# Patient Record
Sex: FEMALE | Race: BLACK OR AFRICAN AMERICAN | NOT HISPANIC OR LATINO | Employment: STUDENT | URBAN - METROPOLITAN AREA
[De-identification: names, ages, dates, MRNs, and addresses within clinical notes are randomized per-mention and may not be internally consistent; named-entity substitution may affect disease eponyms.]

---

## 2018-03-05 ENCOUNTER — LAB VISIT (OUTPATIENT)
Dept: LAB | Facility: HOSPITAL | Age: 28
End: 2018-03-05
Attending: FAMILY MEDICINE
Payer: COMMERCIAL

## 2018-03-05 ENCOUNTER — OFFICE VISIT (OUTPATIENT)
Dept: INTERNAL MEDICINE | Facility: CLINIC | Age: 28
End: 2018-03-05
Payer: COMMERCIAL

## 2018-03-05 VITALS
TEMPERATURE: 99 F | BODY MASS INDEX: 20.41 KG/M2 | OXYGEN SATURATION: 96 % | HEART RATE: 83 BPM | HEIGHT: 66 IN | DIASTOLIC BLOOD PRESSURE: 78 MMHG | SYSTOLIC BLOOD PRESSURE: 112 MMHG | WEIGHT: 127 LBS

## 2018-03-05 DIAGNOSIS — N89.8 VAGINAL DISCHARGE: ICD-10-CM

## 2018-03-05 DIAGNOSIS — Z00.00 ANNUAL PHYSICAL EXAM: ICD-10-CM

## 2018-03-05 DIAGNOSIS — Z00.00 ANNUAL PHYSICAL EXAM: Primary | ICD-10-CM

## 2018-03-05 LAB
BACTERIA GENITAL QL WET PREP: ABNORMAL
CHOLEST SERPL-MCNC: 170 MG/DL
CHOLEST/HDLC SERPL: 1.8 {RATIO}
CLUE CELLS VAG QL WET PREP: ABNORMAL
FILAMENT FUNGI VAG WET PREP-#/AREA: ABNORMAL
HDLC SERPL-MCNC: 97 MG/DL
HDLC SERPL: 57.1 %
LDLC SERPL CALC-MCNC: 59.8 MG/DL
NONHDLC SERPL-MCNC: 73 MG/DL
SPECIMEN SOURCE: ABNORMAL
T VAGINALIS GENITAL QL WET PREP: ABNORMAL
TRIGL SERPL-MCNC: 66 MG/DL
WBC #/AREA VAG WET PREP: ABNORMAL
YEAST GENITAL QL WET PREP: ABNORMAL

## 2018-03-05 PROCEDURE — 87491 CHLMYD TRACH DNA AMP PROBE: CPT

## 2018-03-05 PROCEDURE — 99385 PREV VISIT NEW AGE 18-39: CPT | Mod: S$GLB,,, | Performed by: FAMILY MEDICINE

## 2018-03-05 PROCEDURE — 99999 PR PBB SHADOW E&M-EST. PATIENT-LVL III: CPT | Mod: PBBFAC,,, | Performed by: FAMILY MEDICINE

## 2018-03-05 PROCEDURE — 36415 COLL VENOUS BLD VENIPUNCTURE: CPT

## 2018-03-05 PROCEDURE — 80061 LIPID PANEL: CPT

## 2018-03-05 PROCEDURE — 87210 SMEAR WET MOUNT SALINE/INK: CPT

## 2018-03-05 RX ORDER — METRONIDAZOLE 500 MG/1
500 TABLET ORAL EVERY 12 HOURS
Qty: 14 TABLET | Refills: 0 | Status: SHIPPED | OUTPATIENT
Start: 2018-03-05 | End: 2018-03-12

## 2018-03-05 RX ORDER — FLUCONAZOLE 150 MG/1
150 TABLET ORAL DAILY
Qty: 1 TABLET | Refills: 0 | Status: SHIPPED | OUTPATIENT
Start: 2018-03-05 | End: 2018-03-06

## 2018-03-05 NOTE — PATIENT INSTRUCTIONS
Adult Immunization Schedule  Vaccine How Often Disease Prevented Who Needs It   Influenza Every year Flu. This can be especially dangerous to elderly adults or people with immune disorders. All adults   Tetanus, diphtheria (Td); or Tetanus, diphtheria, and pertussis (Tdap)* One dose of Tdap, then one dose of Td as a booster every 10 years Tetanus (lockjaw), a disease that causes muscles to spasm  Diphtheria, an infection that causes fever, weakness, and breathing problems  Pertussis, also known as whooping cough. This is a highly contagious disease that can cause serious illness. All adults  *This vaccine should be given during each pregnancy no matter how many years since the last vaccine. The vaccine increases protection for your . A  is too young to get the vaccine, but at the highest risk for severe illness and death from pertussis.   Varicella (George)** One series of 2 injections Chickenpox. This is a disease that causes itchy skin bumps, fever, and tiredness. It can lead to scarring, pneumonia, or brain inflammation. Adults who dont have evidence of immunity  **This vaccine should not be given to pregnant women. Women should avoid pregnancy for 4 weeks after the vaccine.   Human papillomavirus (HPV) One series of 3 injections Cervical cancer, caused by certain types of HPV  Vaginal and vulvar cancer  Penile cancer  Head and neck cancers  Anal cancer  Genital warts Females and males ages 15 to 26  Ask your healthcare provider if this vaccine is right for you.   Zoster--- 1 dose Herpes zoster (shingles), a painful rash marked by blisters Adults ages 60 and older.  ---You should not get this vaccine if your immune system is low. For example, if you have HIV, are taking medicines that suppress your immune system, or are getting cancer treatment.   Measles, mumps, rubella (MMR)** 1 or 2 doses, for ages 19 through 49; 1 dose for ages 50 and older if at risk Measles, a disease marked by red spots,  fever, and coughing  Mumps, a disease that causes swelling in the salivary glands. It may affect the ovaries or testes.  Rubella ([place] measles). This is a form of measles that can cause birth defects if a pregnant woman catches it. Adults born in 1957 or later who are not known to be immune to all 3 of these diseases. Ask your healthcare provider if you need a second dose.  **This vaccine should not be given to pregnant women. Women should avoid pregnancy for 4 weeks after vaccination.   Pneumococcal (PCV 13) 1 dose Pneumonia. This is an infection that causes inflammation in your lungs. It can lead to death. Adults ages 65 and older. Also, adults ages 19 and older with weak immune systems or any of these health conditions: chronic renal failure, nephrotic syndrome, functional or anatomic asplenia, cerebrospinal fluid (CSF) leaks, or cochlear implants. This vaccine adds extra protection to PCV 23 and should be given about 2 months before PCV 23.   Pneumococcal (PPSV23)  1 or 2 doses Pneumonia. This is an infection that causes inflammation in your lungs. It can lead to death. Adults ages 65 and older. Also, adults with chronic illnesses, such as asthma, COPD, heart disease, diabetes, liver disease, alcoholism, sickle cell disease, or history of splenectomy. In addition, adults with an immune disorder and adults who smoke cigarettes. This vaccine is recommended for all adults regardless of immune status.   Meningococcal  (MCV or MPSV) 1 or more doses Meningococcal disease (bacterial meningitis). This is inflammation of the membrane covering the brain and spinal cord. It can lead to death. Adults with immune deficiencies or high risk of exposure. Also, college freshmen living in dormitories and  recruits.   Hepatitis A (HepA) One series of 2 injections Hepatitis A. This is an infection that can result in acute liver inflammation and yellow skin and whites of the eyes (jaundice). Adults with risk factors, such  as clotting disorders or chronic liver disease, and adults with high risk of exposure. This includes men who have sex with men, IV drug users, and travelers to countries where hepatitis A is common.   Hepatitis B (HepB) One series of 3 injections Hepatitis B. This is an infection that causes chronic, severe liver disease. Adults with high risk of exposure, such as healthcare providers and sanitation workers, and adults with diabetes   Travelers diseases As needed Infections such as cholera, typhoid, yellow fever, polio, rabies, meningococcal disease, hepatitis A, hepatitis B Adults traveling out of the country. Required vaccines will vary, depending on the country you visit. Check the CDC website: www.cdc.gov.    ,  Based on the CDC National Immunization Program recommendations for adults.  Date Last Reviewed: 2017  © 1717-2960 [employer]. [address] [place] [address]. All rights reserved. This information is not intended as a substitute for professional medical care. Always follow your healthcare professional's instructions.        Adult Immunization Schedule  Vaccine How Often Disease Prevented Who Needs It   Influenza Every year Flu. This can be especially dangerous to elderly adults or people with immune disorders. All adults   Tetanus, diphtheria (Td); or Tetanus, diphtheria, and pertussis (Tdap)* One dose of Tdap, then one dose of Td as a booster every 10 years Tetanus (lockjaw), a disease that causes muscles to spasm  Diphtheria, an infection that causes fever, weakness, and breathing problems  Pertussis, also known as whooping cough. This is a highly contagious disease that can cause serious illness. All adults  *This vaccine should be given during each pregnancy no matter how many years since the last vaccine. The vaccine increases protection for your . A  is too young to get the vaccine, but at the highest risk for severe illness and death from pertussis.    Varicella (George)** One series of 2 injections Chickenpox. This is a disease that causes itchy skin bumps, fever, and tiredness. It can lead to scarring, pneumonia, or brain inflammation. Adults who dont have evidence of immunity  **This vaccine should not be given to pregnant women. Women should avoid pregnancy for 4 weeks after the vaccine.   Human papillomavirus (HPV) One series of 3 injections Cervical cancer, caused by certain types of HPV  Vaginal and vulvar cancer  Penile cancer  Head and neck cancers  Anal cancer  Genital warts Females and males ages 15 to 26  Ask your healthcare provider if this vaccine is right for you.   Zoster--- 1 dose Herpes zoster (shingles), a painful rash marked by blisters Adults ages 60 and older.  ---You should not get this vaccine if your immune system is low. For example, if you have HIV, are taking medicines that suppress your immune system, or are getting cancer treatment.   Measles, mumps, rubella (MMR)** 1 or 2 doses, for ages 19 through 49; 1 dose for ages 50 and older if at risk Measles, a disease marked by red spots, fever, and coughing  Mumps, a disease that causes swelling in the salivary glands. It may affect the ovaries or testes.  Rubella ([place] measles). This is a form of measles that can cause birth defects if a pregnant woman catches it. Adults born in 1957 or later who are not known to be immune to all 3 of these diseases. Ask your healthcare provider if you need a second dose.  **This vaccine should not be given to pregnant women. Women should avoid pregnancy for 4 weeks after vaccination.   Pneumococcal (PCV 13) 1 dose Pneumonia. This is an infection that causes inflammation in your lungs. It can lead to death. Adults ages 65 and older. Also, adults ages 19 and older with weak immune systems or any of these health conditions: chronic renal failure, nephrotic syndrome, functional or anatomic asplenia, cerebrospinal fluid (CSF) leaks, or cochlear implants.  This vaccine adds extra protection to PCV 23 and should be given about 2 months before PCV 23.   Pneumococcal (PPSV23)  1 or 2 doses Pneumonia. This is an infection that causes inflammation in your lungs. It can lead to death. Adults ages 65 and older. Also, adults with chronic illnesses, such as asthma, COPD, heart disease, diabetes, liver disease, alcoholism, sickle cell disease, or history of splenectomy. In addition, adults with an immune disorder and adults who smoke cigarettes. This vaccine is recommended for all adults regardless of immune status.   Meningococcal  (MCV or MPSV) 1 or more doses Meningococcal disease (bacterial meningitis). This is inflammation of the membrane covering the brain and spinal cord. It can lead to death. Adults with immune deficiencies or high risk of exposure. Also, college freshmen living in dormitories and  recruits.   Hepatitis A (HepA) One series of 2 injections Hepatitis A. This is an infection that can result in acute liver inflammation and yellow skin and whites of the eyes (jaundice). Adults with risk factors, such as clotting disorders or chronic liver disease, and adults with high risk of exposure. This includes men who have sex with men, IV drug users, and travelers to countries where hepatitis A is common.   Hepatitis B (HepB) One series of 3 injections Hepatitis B. This is an infection that causes chronic, severe liver disease. Adults with high risk of exposure, such as healthcare providers and sanitation workers, and adults with diabetes   Travelers diseases As needed Infections such as cholera, typhoid, yellow fever, polio, rabies, meningococcal disease, hepatitis A, hepatitis B Adults traveling out of the country. Required vaccines will vary, depending on the country you visit. Check the CDC website: www.cdc.gov.    ,  Based on the CDC National Immunization Program recommendations for adults.  Date Last Reviewed: 2/1/2017  © 3092-9982 [employer]. [address] [place] [address]. All rights reserved. This information is not intended as a substitute for professional medical care. Always follow your healthcare professional's instructions.

## 2018-03-05 NOTE — PROGRESS NOTES
Cyndy Rivera  03/05/2018  [id number]    Primary Doctor No  Patient Care Team:  Primary Doctor No as PCP - General  Has the patient seen any provider outside of the [facility] network since the last visit? (no). If yes, HIPPA forms completed and records requested.        Visit Type:New patient, vaginal discharge    Chief Complaint:  Chief Complaint   Patient presents with    Establish Care     vaginal discharge       History of Present Illness:  HPI     Establish Care    Additional comments: vaginal discharge       Last edited by Melina Fulton LPN on 3/5/2018  1:03 PM. (History)      Patient is here for vaginal odor.  Pap is up to date with [facility] include [facility] beds [facility] the [facility].  She reports period last month.    She denies any medical issues.    She is not on any medication.      History:  History reviewed. No pertinent past medical history.  Past Surgical History:   Procedure Laterality Date    TONSILLECTOMY       History reviewed. No pertinent family history.  Social History     Social History    Marital status: Single     Spouse name: N/A    Number of children: N/A    Years of education: N/A     Occupational History    adm/ [employer]      Social History Main Topics    Smoking status: Never Smoker    Smokeless tobacco: Never Used    Alcohol use Yes      Comment: Occasionally    Drug use: No    Sexual activity: Yes     Partners: Male     Other Topics Concern    Not on file     Social History Narrative    No narrative on file     There is no problem list on file for this patient.    Review of patient's allergies indicates:  No Known Allergies    The following were reviewed at this visit: active problem list, medication list, allergies, family history, social history, and health maintenance.    Medications:  No current outpatient prescriptions on file prior to visit.     No current facility-administered medications on file prior to visit.        Medications have been reviewed and reconciled with patient at this  visit.  Barriers to medications present (no)    Adverse reactions to current medications (no)    Over the counter medications reviewed (Yes ), and if needed added to active Medication list at this visit.     Exam:  Wt Readings from Last 3 Encounters:   03/05/18 57.6 kg (127 lb)   04/24/13 61.2 kg (135 lb)     Temp Readings from Last 3 Encounters:   03/05/18 99 °F (37.2 °C) (Tympanic)   04/24/13 98.5 °F (36.9 °C) (Oral)     BP Readings from Last 3 Encounters:   03/05/18 112/78   04/24/13 109/66     Pulse Readings from Last 3 Encounters:   03/05/18 83   04/24/13 104     Body mass index is 20.81 kg/m².    Review of Systems   Constitutional: Negative.  Negative for chills and fever.   HENT: Negative.  Negative for congestion, sinus pain and sore throat.    Eyes: Negative for blurred vision and double vision.   Respiratory: Negative for cough, sputum production, shortness of breath and wheezing.    Cardiovascular: Negative for chest pain, palpitations and leg swelling.   Gastrointestinal: Negative for abdominal pain, constipation, diarrhea, heartburn, nausea and vomiting.   Genitourinary: Negative.         Vaginal discharge     Musculoskeletal: Negative.    Skin: Negative.  Negative for rash.   Neurological: Negative.    Endo/Heme/Allergies: Negative.  Negative for polydipsia. Does not bruise/bleed easily.   Psychiatric/Behavioral: Negative for depression and substance abuse.         Physical Exam   Constitutional: She is oriented to person, place, and time. She appears well-developed and well-nourished. No distress.   HENT:   Head: Normocephalic and atraumatic.   Right Ear: External ear normal.   Left Ear: External ear normal.   Nose: Nose normal.   Mouth/Throat: Oropharynx is clear and moist. No oropharyngeal exudate.   Eyes: Conjunctivae and EOM are normal. Pupils are equal, round, and reactive to light. Right eye exhibits no discharge. Left eye exhibits no discharge.   Neck: Normal range of motion. Neck supple. No  thyromegaly present.   Cardiovascular: Normal rate, regular rhythm, normal heart sounds and intact distal pulses.    No murmur heard.  Pulmonary/Chest: Effort normal and breath sounds normal. No respiratory distress. She has no wheezes.   Abdominal: Soft. Bowel sounds are normal. She exhibits no distension and no mass. There is no tenderness.   Musculoskeletal: Normal range of motion. She exhibits no edema.   Lymphadenopathy:     She has no cervical adenopathy.   Neurological: She is alert and oriented to person, place, and time. No cranial nerve deficit.   Skin: Capillary refill takes less than 2 seconds. She is not diaphoretic.   Psychiatric: She has a normal mood and affect. Her behavior is normal. Judgment and thought content normal.   Nursing note and vitals reviewed.      Laboratory Reviewed ({N/A)  Lab Results   Component Value Date    WBC 4.97 04/24/2013    HGB 11.3 (L) 04/24/2013    HCT 37.6 04/24/2013     04/24/2013    ALT 13 04/24/2013    AST 16 04/24/2013     04/24/2013    K 4.3 04/24/2013     04/24/2013    CREATININE 0.7 04/24/2013    BUN 7 04/24/2013    CO2 22 (L) 04/24/2013       Assessment:  The primary encounter diagnosis was Annual physical exam. A diagnosis of Vaginal discharge was also pertinent to this visit.     Plan       Annual physical exam  -     Lipid panel; Future; Expected date: 03/05/2018  -     Cancel: C. trachomatis/N. gonorrhoeae by AMP DNA Vagina  -     C. trachomatis/N. gonorrhoeae by AMP DNA Urine    Vaginal discharge  -     Vaginal Screen Vagina; Future; Expected date: 03/05/2018  -     metroNIDAZOLE (FLAGYL) 500 MG tablet; Take 1 tablet (500 mg total) by mouth every 12 (twelve) hours.  Dispense: 14 tablet; Refill: 0  -     fluconazole (DIFLUCAN) 150 MG Tab; Take 1 tablet (150 mg total) by mouth once daily.  Dispense: 1 tablet; Refill: 0      Care Plan/Goals: Reviewed  (N/A)  Goals     None          Follow up: Follow-up in about 1 year (around  3/5/2019).    After visit summary was printed and given to patient upon discharge today.  Patient goals and care plan are included in After Visit Summary.

## 2018-03-06 ENCOUNTER — TELEPHONE (OUTPATIENT)
Dept: INTERNAL MEDICINE | Facility: CLINIC | Age: 28
End: 2018-03-06

## 2018-03-06 LAB
C TRACH DNA SPEC QL NAA+PROBE: NOT DETECTED
N GONORRHOEA DNA SPEC QL NAA+PROBE: NOT DETECTED

## 2018-03-06 NOTE — PROGRESS NOTES
Called pt.  Left message  Labs okay.  Wet prep showed rate bacteria.  Complete flagyl  Lipid okay.

## 2018-04-18 ENCOUNTER — PATIENT OUTREACH (OUTPATIENT)
Dept: ADMINISTRATIVE | Facility: HOSPITAL | Age: 28
End: 2018-04-18

## 2018-07-10 ENCOUNTER — PATIENT MESSAGE (OUTPATIENT)
Dept: INTERNAL MEDICINE | Facility: CLINIC | Age: 28
End: 2018-07-10

## 2018-07-12 ENCOUNTER — PATIENT MESSAGE (OUTPATIENT)
Dept: INTERNAL MEDICINE | Facility: CLINIC | Age: 28
End: 2018-07-12

## 2018-07-12 RX ORDER — METRONIDAZOLE 500 MG/1
500 TABLET ORAL EVERY 12 HOURS
Qty: 14 TABLET | Refills: 0 | Status: CANCELLED | OUTPATIENT
Start: 2018-07-12 | End: 2018-07-19

## 2018-07-12 RX ORDER — METRONIDAZOLE 500 MG/1
500 TABLET ORAL EVERY 12 HOURS
Qty: 14 TABLET | Refills: 0 | Status: SHIPPED | OUTPATIENT
Start: 2018-07-12 | End: 2018-07-19

## 2018-08-01 ENCOUNTER — PATIENT MESSAGE (OUTPATIENT)
Dept: INTERNAL MEDICINE | Facility: CLINIC | Age: 28
End: 2018-08-01

## 2018-08-13 ENCOUNTER — PATIENT MESSAGE (OUTPATIENT)
Dept: INTERNAL MEDICINE | Facility: CLINIC | Age: 28
End: 2018-08-13

## 2018-08-15 ENCOUNTER — TELEPHONE (OUTPATIENT)
Dept: OBSTETRICS AND GYNECOLOGY | Facility: CLINIC | Age: 28
End: 2018-08-15

## 2018-08-15 NOTE — TELEPHONE ENCOUNTER
Spoke to patient. Patient was under the impression that the IUD will be placed at the time of her visit. Advised patient that she has to be counseled/evaluated prior to ordering the device and that the device usually takes 2-3 weeks to receive. Advised her that once the device is received we call to schedule an appointment for the insertion. Patient stated that she is leaving her job soon and was unsure of how long her insurance was going to stay effective. Advised patient of our process. Patient verbalized understanding and chose to keep appointment.

## 2018-08-15 NOTE — TELEPHONE ENCOUNTER
----- Message from Krystal Field sent at 8/15/2018  9:45 AM CDT -----  Pt is requesting a call from nurse to discuss r/s to earlier. Pt wants to discuss medical records.          Please call pt back at [phone]

## 2018-08-16 ENCOUNTER — OFFICE VISIT (OUTPATIENT)
Dept: OBSTETRICS AND GYNECOLOGY | Facility: CLINIC | Age: 28
End: 2018-08-16
Payer: COMMERCIAL

## 2018-08-16 VITALS
SYSTOLIC BLOOD PRESSURE: 102 MMHG | HEIGHT: 66 IN | WEIGHT: 133.63 LBS | DIASTOLIC BLOOD PRESSURE: 68 MMHG | BODY MASS INDEX: 21.47 KG/M2

## 2018-08-16 DIAGNOSIS — Z30.014 ENCOUNTER FOR INITIAL PRESCRIPTION OF INTRAUTERINE CONTRACEPTIVE DEVICE (IUD): Primary | ICD-10-CM

## 2018-08-16 PROCEDURE — 99203 OFFICE O/P NEW LOW 30 MIN: CPT | Mod: S$GLB,,, | Performed by: OBSTETRICS & GYNECOLOGY

## 2018-08-16 PROCEDURE — 3008F BODY MASS INDEX DOCD: CPT | Mod: CPTII,S$GLB,, | Performed by: OBSTETRICS & GYNECOLOGY

## 2018-08-16 PROCEDURE — 99999 PR PBB SHADOW E&M-EST. PATIENT-LVL III: CPT | Mod: PBBFAC,,, | Performed by: OBSTETRICS & GYNECOLOGY

## 2018-08-16 NOTE — PROGRESS NOTES
Subjective:       Patient ID: Cyndy Rivera is a 28 y.o. female.    Chief Complaint:  Contraception      History of Present Illness  HPI  Presents for consultation for IUD.  The patient has regular, monthly periods that are lighter and only last about 5 days.  No heavy bleeding.  No significant cramping.  She is mutually monogamous, and recent GC/CT a few months ago was negative.  She is applying to nursing school, and is interested in an IUD for contraception.  Has a normal pap 2016 scanned into EPIC.    GYN & OB History  Patient's last menstrual period was 2018 (within days).   Date of Last Pap: No result found    OB History    Para Term  AB Living   3 1 1 0 2 1   SAB TAB Ectopic Multiple Live Births   0 2 0 0 1      # Outcome Date GA Lbr Karl/2nd Weight Sex Delivery Anes PTL Lv   3 Term 14   2.75 kg (6 lb 1 oz) F CS-LTranv   TAMMIE      Complications: Fetal Intolerance   2 TAB            1 TAB                   Review of Systems  Review of Systems   Constitutional: Negative for fatigue, fever and unexpected weight change.   Gastrointestinal: Negative for abdominal pain, constipation, diarrhea, nausea and vomiting.   Genitourinary: Negative for dysuria, frequency, menorrhagia, menstrual problem, pelvic pain, urgency, vaginal bleeding, vaginal discharge, vaginal pain, dysmenorrhea, postcoital bleeding and vaginal odor.           Objective:    Physical Exam:   Constitutional: She is oriented to person, place, and time. She appears well-developed and well-nourished. No distress.                           Neurological: She is alert and oriented to person, place, and time.    Skin: No rash noted. No erythema. No pallor.    Psychiatric: She has a normal mood and affect. Her behavior is normal. Judgment and thought content normal.          Assessment:        1. Encounter for initial prescription of intrauterine contraceptive device (IUD)                Plan:      Cyndy was seen today for  contraception.    Diagnoses and all orders for this visit:    Encounter for initial prescription of intrauterine contraceptive device (IUD)  -     levonorgestrel (KYLEENA) 17.5 mcg/24 hr (5 years) IUD; 1 Intra Uterine Device (17.5 mcg total) by Intrauterine route once. for 1 dose    Reviewed walter, kyleena, mirena, and paragard IUD with the patient.  Discussed typical side effects, risks, and benefits of each device.  Patient would like to try Kyleena.  Order placed to specialty pharmacy.  RTC for insertion.

## 2018-08-17 ENCOUNTER — TELEPHONE (OUTPATIENT)
Dept: PHARMACY | Facility: CLINIC | Age: 28
End: 2018-08-17

## 2018-08-17 NOTE — TELEPHONE ENCOUNTER
Ramo Case,      [facility] received a prescription for KYLEENA. Upon calling the patient's insurance company, we have been told that IUDs are excluded under the patient's pharmacy benefits.      [facility] is unable to bill medical claims for medications.  The medication itself, and the administration of the medication, will both have to be billed under the medical benefit and may require a prior authorization. Please contact [facility] with any questions at [phone].      Thank you,          Iva Maynadr         Patient Care Advocate         [facility]

## 2018-08-20 DIAGNOSIS — Z97.5 CONTRACEPTION, DEVICE INTRAUTERINE: Primary | ICD-10-CM

## 2018-08-21 ENCOUNTER — TELEPHONE (OUTPATIENT)
Dept: OBSTETRICS AND GYNECOLOGY | Facility: CLINIC | Age: 28
End: 2018-08-21

## 2018-08-21 NOTE — TELEPHONE ENCOUNTER
Called pt to notify her Kyleena was approved. Scheduled insertion of IUD. Patient verbalized understanding.

## 2018-08-28 ENCOUNTER — PATIENT MESSAGE (OUTPATIENT)
Dept: INTERNAL MEDICINE | Facility: CLINIC | Age: 28
End: 2018-08-28

## 2018-08-28 ENCOUNTER — TELEPHONE (OUTPATIENT)
Dept: OBSTETRICS AND GYNECOLOGY | Facility: CLINIC | Age: 28
End: 2018-08-28

## 2018-08-28 DIAGNOSIS — Z02.0 ENCOUNTER FOR SCHOOL HEALTH EXAMINATION: Primary | ICD-10-CM

## 2018-08-28 NOTE — TELEPHONE ENCOUNTER
Returned pt call. Pt needed to reschedule her IUD insertion due to her starting her cycle today. Assisted pt with rescheduling.

## 2018-08-28 NOTE — TELEPHONE ENCOUNTER
----- Message from Sarah Lincoln sent at 8/28/2018  8:03 AM CDT -----  Contact: pt  Pt returning nurse call, please call pt @ [phone].

## 2018-08-30 ENCOUNTER — LAB VISIT (OUTPATIENT)
Dept: LAB | Facility: HOSPITAL | Age: 28
End: 2018-08-30
Attending: FAMILY MEDICINE
Payer: COMMERCIAL

## 2018-08-30 ENCOUNTER — PROCEDURE VISIT (OUTPATIENT)
Dept: OBSTETRICS AND GYNECOLOGY | Facility: CLINIC | Age: 28
End: 2018-08-30
Payer: COMMERCIAL

## 2018-08-30 VITALS
DIASTOLIC BLOOD PRESSURE: 80 MMHG | SYSTOLIC BLOOD PRESSURE: 112 MMHG | BODY MASS INDEX: 21.18 KG/M2 | WEIGHT: 131.81 LBS | HEIGHT: 66 IN

## 2018-08-30 DIAGNOSIS — Z30.430 ENCOUNTER FOR IUD INSERTION: Primary | ICD-10-CM

## 2018-08-30 DIAGNOSIS — Z02.0 ENCOUNTER FOR SCHOOL HEALTH EXAMINATION: ICD-10-CM

## 2018-08-30 PROCEDURE — 58300 INSERT INTRAUTERINE DEVICE: CPT | Mod: S$GLB,,, | Performed by: NURSE PRACTITIONER

## 2018-08-30 PROCEDURE — 36415 COLL VENOUS BLD VENIPUNCTURE: CPT

## 2018-08-30 PROCEDURE — 86706 HEP B SURFACE ANTIBODY: CPT

## 2018-08-30 NOTE — PROCEDURES
Procedures   CC: IUD PLACEMENT    Patient is presenting for a Kyleena/IUD placement.  UPT is negative.        PRE-IUD PLACEMENT COUNSELING:  All contraceptive options were reviewed and the patient chooses an IUD.  The patient's history was reviewed and there are no contraindications to an IUD. The procedure and minimal risks of pain, bleeding, perforation and infection at the insertion and spontaneous expulsion within the first two weeks was discussed. The benefits of amenorrhea and no systemic side effects were explained. All questions were answered and the patient agrees to proceed. Consent was signed (scanned into computer).    PROCEDURE:  TIME OUT PERFORMED.  The cervix visualized with a speculum.  A single tooth tenaculum placed on the anterior lip.  The uterus sounded to 7 cm using sterile technique.  A Kyleena/ IUD was loaded and placed high in uterine fundus without difficulty using sterile technique.  The string was cut to 2-3cm length from exo cervix.  The tenaculum and speculum were removed. The patient tolerated the procedure well.    ASSESSMENT:  1. Contraception management / IUD insertion.V25.0.    POST IUD PLACEMENT COUNSELING:  Manage post IUD placement pain with NSAIDs, Tylenol or Rx per Med[employer].  IUD danger signs and how to check the strings.  Removal in 5 years for Mirena IUD and in 10 years for Copper IUD.    Counseling lasted approximately 15 minutes and all her questions were answered.    FOLLOW-UP: With me in 4 weeks.  LOT [id number]

## 2018-08-31 LAB — HBV SURFACE AB SER-ACNC: NEGATIVE M[IU]/ML

## 2018-09-04 DIAGNOSIS — Z23 NEED FOR HEPATITIS B VACCINATION: Primary | ICD-10-CM

## 2018-09-05 ENCOUNTER — PATIENT MESSAGE (OUTPATIENT)
Dept: INTERNAL MEDICINE | Facility: CLINIC | Age: 28
End: 2018-09-05

## 2018-09-06 ENCOUNTER — TELEPHONE (OUTPATIENT)
Dept: INTERNAL MEDICINE | Facility: CLINIC | Age: 28
End: 2018-09-06

## 2018-09-06 DIAGNOSIS — Z11.1 SCREENING-PULMONARY TB: Primary | ICD-10-CM

## 2018-09-06 NOTE — TELEPHONE ENCOUNTER
----- Message from Ana Parker sent at 9/6/2018 10:54 AM CDT -----  Contact: patient  Returning your call. Please call patient @ [phone]. Thanks, samy

## 2018-09-14 ENCOUNTER — CLINICAL SUPPORT (OUTPATIENT)
Dept: INTERNAL MEDICINE | Facility: CLINIC | Age: 28
End: 2018-09-14
Payer: COMMERCIAL

## 2018-09-14 ENCOUNTER — LAB VISIT (OUTPATIENT)
Dept: LAB | Facility: HOSPITAL | Age: 28
End: 2018-09-14
Attending: FAMILY MEDICINE
Payer: COMMERCIAL

## 2018-09-14 DIAGNOSIS — Z11.1 VISIT FOR TB SKIN TEST: Primary | ICD-10-CM

## 2018-09-14 DIAGNOSIS — Z92.29 IMMUNIZATION SERIES COMPLETE: ICD-10-CM

## 2018-09-14 DIAGNOSIS — Z11.1 VISIT FOR TB SKIN TEST: ICD-10-CM

## 2018-09-14 DIAGNOSIS — Z92.29 IMMUNIZATION SERIES COMPLETE: Primary | ICD-10-CM

## 2018-09-14 PROCEDURE — 86787 VARICELLA-ZOSTER ANTIBODY: CPT

## 2018-09-14 PROCEDURE — 86762 RUBELLA ANTIBODY: CPT

## 2018-09-14 PROCEDURE — 86765 RUBEOLA ANTIBODY: CPT

## 2018-09-14 PROCEDURE — 86580 POCT TB SKIN TEST: ICD-10-PCS | Mod: S$GLB,,, | Performed by: FAMILY MEDICINE

## 2018-09-14 PROCEDURE — 36415 COLL VENOUS BLD VENIPUNCTURE: CPT

## 2018-09-14 PROCEDURE — 99999 PR PBB SHADOW E&M-EST. PATIENT-LVL I: CPT | Mod: PBBFAC,,,

## 2018-09-14 PROCEDURE — 99999 PR PBB SHADOW E&M-EST. PATIENT-LVL I: ICD-10-PCS | Mod: PBBFAC,,,

## 2018-09-14 PROCEDURE — 86580 TB INTRADERMAL TEST: CPT | Mod: S$GLB,,, | Performed by: FAMILY MEDICINE

## 2018-09-14 PROCEDURE — 86735 MUMPS ANTIBODY: CPT

## 2018-09-15 LAB
MUMPS IGG INTERPRETATION: POSITIVE
MUMPS IGG SCREEN: 2.5 ISR
RUBEOLA IGG ANTIBODY: 2.5 ISR
RUBEOLA INTERPRETATION: POSITIVE
VARICELLA INTERPRETATION: POSITIVE
VARICELLA ZOSTER IGG: 3.6 ISR

## 2018-09-17 ENCOUNTER — CLINICAL SUPPORT (OUTPATIENT)
Dept: INTERNAL MEDICINE | Facility: CLINIC | Age: 28
End: 2018-09-17
Payer: COMMERCIAL

## 2018-09-17 LAB
RUBV IGG SER-ACNC: 69.7 IU/ML
RUBV IGG SER-IMP: REACTIVE

## 2018-09-26 ENCOUNTER — OFFICE VISIT (OUTPATIENT)
Dept: OBSTETRICS AND GYNECOLOGY | Facility: CLINIC | Age: 28
End: 2018-09-26
Payer: COMMERCIAL

## 2018-09-26 VITALS
DIASTOLIC BLOOD PRESSURE: 74 MMHG | BODY MASS INDEX: 22.11 KG/M2 | HEIGHT: 65 IN | WEIGHT: 132.69 LBS | SYSTOLIC BLOOD PRESSURE: 118 MMHG

## 2018-09-26 DIAGNOSIS — Z30.431 IUD CHECK UP: Primary | ICD-10-CM

## 2018-09-26 PROCEDURE — 3008F BODY MASS INDEX DOCD: CPT | Mod: CPTII,S$GLB,, | Performed by: NURSE PRACTITIONER

## 2018-09-26 PROCEDURE — 99213 OFFICE O/P EST LOW 20 MIN: CPT | Mod: S$GLB,,, | Performed by: NURSE PRACTITIONER

## 2018-09-26 PROCEDURE — 99999 PR PBB SHADOW E&M-EST. PATIENT-LVL III: CPT | Mod: PBBFAC,,, | Performed by: NURSE PRACTITIONER

## 2018-09-26 NOTE — PROGRESS NOTES
"Cyndy Rivera is a 28 y.o. female  presents for kyleena IUD checkup - placement 18 by [employer].  LMP: Patient's last menstrual period was 2018..  No issues, problems, or complaints.      History reviewed. No pertinent past medical history.  Past Surgical History:   Procedure Laterality Date     SECTION      DILATION AND CURETTAGE OF UTERUS      for EAB    TONSILLECTOMY       Social History     Socioeconomic History    Marital status: Single     Spouse name: Not on file    Number of children: Not on file    Years of education: Not on file    Highest education level: Not on file   Social Needs    Financial resource strain: Not on file    Food insecurity - worry: Not on file    Food insecurity - inability: Not on file    Transportation needs - medical: Not on file    Transportation needs - non-medical: Not on file   Occupational History    Occupation: adm/ [employer]   Tobacco Use    Smoking status: Never Smoker    Smokeless tobacco: Never Used   Substance and Sexual Activity    Alcohol use: Yes     Frequency: 2-3 times a week     Drinks per session: 3 or 4     Binge frequency: Never     Comment: Occasionally    Drug use: No    Sexual activity: Yes     Partners: Male     Birth control/protection: IUD   Other Topics Concern    Not on file   Social History Narrative    Not on file     Family History   Problem Relation Age of Onset    Breast cancer Neg Hx     Colon cancer Neg Hx     Ovarian cancer Neg Hx      OB History      Para Term  AB Living    3 1 1 0 2 1    SAB TAB Ectopic Multiple Live Births    0 2 0 0 1          /74   Ht 5' 5" (1.651 m)   Wt 60.2 kg (132 lb 11.5 oz)   LMP 2018   BMI 22.09 kg/m²       ROS:  Per hpi    PHYSICAL EXAM:  APPEARANCE: Well nourished, well developed, in no acute distress.  AFFECT: WNL, alert and oriented x 3  SKIN: No acne or hirsutism  PELVIC: Normal external genitalia without lesions.  Normal hair " distribution.  Adequate perineal body, normal urethral meatus.  Vagina moist and well rugated without lesions or discharge.  Cervix pink - IUD strings noted, without lesions, discharge or tenderness.  No significant cystocele or rectocele.  Bimanual exam shows uterus to be normal size, regular, mobile and nontender.  Adnexa without masses or tenderness.    EXTREMITIES: No edema.  Physical Exam    1. IUD check up      AND PLAN:    Patient was counseled today on A.C.S. Pap guidelines and recommendations for yearly pelvic exams, mammograms and monthly self breast exams; to see her PCP for other health maintenance.     Recheck  for annual in late December or in January

## 2019-09-05 ENCOUNTER — TELEPHONE (OUTPATIENT)
Dept: INTERNAL MEDICINE | Facility: CLINIC | Age: 29
End: 2019-09-05

## 2019-09-05 NOTE — TELEPHONE ENCOUNTER
Left a message to let patient know I will get with a nurse to find out what she has already had and what does she need.

## 2019-09-05 NOTE — TELEPHONE ENCOUNTER
----- Message from Malathi Cervantes sent at 9/5/2019  4:37 PM CDT -----  Contact: self  Patient requesting a call back to know what immunizations she has already received and which ones she still needs. Please call patient back at [phone]

## 2019-09-06 ENCOUNTER — TELEPHONE (OUTPATIENT)
Dept: INTERNAL MEDICINE | Facility: CLINIC | Age: 29
End: 2019-09-06

## 2019-09-06 NOTE — TELEPHONE ENCOUNTER
----- Message from Malathi Welsh MD sent at 9/6/2019  8:25 AM CDT -----  Contact: pt   Last year she did labs  She is immune to Varicella, MMR.  (does need to get those again)  She was not immune to Hep B, and she had a booster shot last year. (I don't see she had a follow up blood work)    She would need to repeat the serology for Hep B. To see if immune.     What is her school requiring?  She had Menactra according to her records    She should get the flu vaccine.    Dr. Welsh    ----- Message -----  From: Yuliya Jones MA  Sent: 9/6/2019   8:20 AM  To: Malathi Welsh MD    Can you tell me what other shots she needs?   ----- Message -----  From: Josie Slaughter  Sent: 9/5/2019   4:54 PM  To: Blaise Servin Staff    .Type:  Patient Returning Call    Who Called:pt  Who Left Message for Patient: nurse  Does the patient know what this is regarding?: shots  Would the patient rather a call back or a response via [facility]?  Call back   Best Call Back Number: [phone] (home)     Additional Information: #...

## 2019-09-06 NOTE — TELEPHONE ENCOUNTER
There is no immunization against Hep C.    She can look into getting Hep A vaccine if she wants, or check for immunity

## 2019-09-06 NOTE — TELEPHONE ENCOUNTER
Called and spoke with patient she was inquiring of the hep c. She said she still had the copies of her immunizations and she was going to look at her labs on the portal. She said that she will get it done in [place].

## 2020-10-06 ENCOUNTER — PATIENT MESSAGE (OUTPATIENT)
Dept: ADMINISTRATIVE | Facility: HOSPITAL | Age: 30
End: 2020-10-06

## 2021-04-28 ENCOUNTER — PATIENT MESSAGE (OUTPATIENT)
Dept: RESEARCH | Facility: HOSPITAL | Age: 31
End: 2021-04-28

## 2024-01-11 NOTE — TELEPHONE ENCOUNTER
----- Message from Moses Romano sent at 3/6/2018  8:03 AM CST -----  Contact: pt  She's calling in regards to a missed call, [phone] (home)     LVM to please call office back verified medication re quest